# Patient Record
Sex: MALE | URBAN - METROPOLITAN AREA
[De-identification: names, ages, dates, MRNs, and addresses within clinical notes are randomized per-mention and may not be internally consistent; named-entity substitution may affect disease eponyms.]

---

## 2021-04-01 ENCOUNTER — APPOINTMENT (RX ONLY)
Dept: URBAN - METROPOLITAN AREA CLINIC 87 | Facility: CLINIC | Age: 71
Setting detail: DERMATOLOGY
End: 2021-04-01

## 2021-04-01 DIAGNOSIS — L72.8 OTHER FOLLICULAR CYSTS OF THE SKIN AND SUBCUTANEOUS TISSUE: ICD-10-CM

## 2021-04-01 DIAGNOSIS — D22 MELANOCYTIC NEVI: ICD-10-CM

## 2021-04-01 PROBLEM — D22.122 MELANOCYTIC NEVI OF LEFT LOWER EYELID, INCLUDING CANTHUS: Status: ACTIVE | Noted: 2021-04-01

## 2021-04-01 PROCEDURE — ? OBSERVATION AND MEASURE

## 2021-04-01 PROCEDURE — 99202 OFFICE O/P NEW SF 15 MIN: CPT

## 2021-04-01 PROCEDURE — ? COUNSELING

## 2021-04-01 PROCEDURE — ? DIAGNOSIS COMMENT

## 2021-04-01 ASSESSMENT — LOCATION ZONE DERM
LOCATION ZONE: SCALP
LOCATION ZONE: EYELID

## 2021-04-01 ASSESSMENT — LOCATION DETAILED DESCRIPTION DERM
LOCATION DETAILED: LEFT LATERAL INFERIOR EYELID
LOCATION DETAILED: RIGHT INFERIOR OCCIPITAL SCALP

## 2021-04-01 ASSESSMENT — LOCATION SIMPLE DESCRIPTION DERM
LOCATION SIMPLE: LEFT INFERIOR EYELID
LOCATION SIMPLE: POSTERIOR SCALP

## 2021-04-01 NOTE — PROCEDURE: DIAGNOSIS COMMENT
Comment: This was recently inflamed 2 weeks ago and is no longer painful following I&D and course of antibiotics that was given in the ER. It is no longer inflamed but it is still calming down and healing from the I&D.
Detail Level: Simple
Render Risk Assessment In Note?: no
Comment: Benign nevus within redundant lower eyelid skin

## 2021-04-01 NOTE — PROCEDURE: OBSERVATION
Detail Level: Detailed
Size Of Lesion: 2.3 x 2.0 cm
Morphology Per Location (Optional): soft mobile nodule
Instructions (Optional): We discussed the option of excision. He would like to hold off. Ok to schedule in the future if he changes his mind, but I recommended that he wait at least 1 month so that the inflammation can fully calm down prior to removal. Otherwise, it is ok to just leave this alone. He should not squeeze or manipulate this lesion.
Size Of Lesion: 1.0 x 0.8 cm
Morphology Per Location (Optional): soft brown papule
Instructions (Optional): We discussed the option of excision if bothersome, but otherwise it is ok to leave this lesion alone. It is in an area of excess lower eyelid tissue and would be safe to perform in the office here. He was told to call to schedule if he changes his mind in the future.

## 2021-04-01 NOTE — PROCEDURE: COUNSELING
Patient Specific Counseling (Will Not Stick From Patient To Patient): Patient was instructed not to squeeze or manipulate this lesion.
Detail Level: Detailed